# Patient Record
Sex: FEMALE | Race: WHITE | NOT HISPANIC OR LATINO | Employment: FULL TIME | URBAN - METROPOLITAN AREA
[De-identification: names, ages, dates, MRNs, and addresses within clinical notes are randomized per-mention and may not be internally consistent; named-entity substitution may affect disease eponyms.]

---

## 2022-09-28 ENCOUNTER — LAB REQUISITION (OUTPATIENT)
Dept: LAB | Facility: CLINIC | Age: 62
End: 2022-09-28

## 2022-09-28 LAB — SARS-COV-2 RNA RESP QL NAA+PROBE: NEGATIVE

## 2022-09-28 PROCEDURE — U0003 INFECTIOUS AGENT DETECTION BY NUCLEIC ACID (DNA OR RNA); SEVERE ACUTE RESPIRATORY SYNDROME CORONAVIRUS 2 (SARS-COV-2) (CORONAVIRUS DISEASE [COVID-19]), AMPLIFIED PROBE TECHNIQUE, MAKING USE OF HIGH THROUGHPUT TECHNOLOGIES AS DESCRIBED BY CMS-2020-01-R: HCPCS | Performed by: INTERNAL MEDICINE

## 2022-10-06 ENCOUNTER — LAB REQUISITION (OUTPATIENT)
Dept: LAB | Facility: CLINIC | Age: 62
End: 2022-10-06

## 2022-10-06 LAB — SARS-COV-2 RNA RESP QL NAA+PROBE: NEGATIVE

## 2022-10-06 PROCEDURE — U0003 INFECTIOUS AGENT DETECTION BY NUCLEIC ACID (DNA OR RNA); SEVERE ACUTE RESPIRATORY SYNDROME CORONAVIRUS 2 (SARS-COV-2) (CORONAVIRUS DISEASE [COVID-19]), AMPLIFIED PROBE TECHNIQUE, MAKING USE OF HIGH THROUGHPUT TECHNOLOGIES AS DESCRIBED BY CMS-2020-01-R: HCPCS | Performed by: INTERNAL MEDICINE

## 2022-10-12 ENCOUNTER — LAB REQUISITION (OUTPATIENT)
Dept: LAB | Facility: CLINIC | Age: 62
End: 2022-10-12

## 2022-10-12 PROCEDURE — U0003 INFECTIOUS AGENT DETECTION BY NUCLEIC ACID (DNA OR RNA); SEVERE ACUTE RESPIRATORY SYNDROME CORONAVIRUS 2 (SARS-COV-2) (CORONAVIRUS DISEASE [COVID-19]), AMPLIFIED PROBE TECHNIQUE, MAKING USE OF HIGH THROUGHPUT TECHNOLOGIES AS DESCRIBED BY CMS-2020-01-R: HCPCS | Performed by: INTERNAL MEDICINE

## 2022-10-13 ENCOUNTER — LAB REQUISITION (OUTPATIENT)
Dept: LAB | Facility: CLINIC | Age: 62
End: 2022-10-13

## 2022-10-13 DIAGNOSIS — R19.09 OTHER INTRA-ABDOMINAL AND PELVIC SWELLING, MASS AND LUMP: ICD-10-CM

## 2022-10-13 LAB
CANCER AG125 SERPL-ACNC: 12 U/ML
SARS-COV-2 RNA RESP QL NAA+PROBE: NEGATIVE

## 2022-10-13 PROCEDURE — 86304 IMMUNOASSAY TUMOR CA 125: CPT | Performed by: OBSTETRICS & GYNECOLOGY

## 2022-10-31 ENCOUNTER — TRANSFERRED RECORDS (OUTPATIENT)
Dept: HEALTH INFORMATION MANAGEMENT | Facility: CLINIC | Age: 62
End: 2022-10-31

## 2022-10-31 LAB
ALT SERPL-CCNC: 22 IU/L (ref 12–68)
AST SERPL-CCNC: 15 IU/L (ref 15–37)
CHOLESTEROL (EXTERNAL): 271 MG/DL
CREATININE (EXTERNAL): 1.18 MG/DL (ref 0.6–1)
GFR ESTIMATED (EXTERNAL): 52 ML/MIN/1.73M2
GLUCOSE (EXTERNAL): 141 MG/DL (ref 70–110)
HDLC SERPL-MCNC: 93 MG/DL
LDL CHOLESTEROL CALCULATED (EXTERNAL): 150 MG/DL
POTASSIUM (EXTERNAL): 3.5 MMOL/L (ref 3.5–5.1)
TRIGLYCERIDES (EXTERNAL): 138 MG/DL

## 2022-11-01 RX ORDER — AMLODIPINE BESYLATE 5 MG/1
5 TABLET ORAL EVERY MORNING
COMMUNITY

## 2022-11-01 RX ORDER — ACETAMINOPHEN 500 MG
1000 TABLET ORAL EVERY MORNING
COMMUNITY

## 2022-11-01 RX ORDER — IBUPROFEN 200 MG
800 TABLET ORAL PRN
COMMUNITY

## 2022-11-01 NOTE — PROGRESS NOTES
PTA medications updated by Medication Scribe prior to surgery via phone call with patient (last doses completed by Nurse)     Medication history sources: Patient, H&P and Patient's home med list  In the past week, patient estimated taking medication this percent of the time: Greater than 90%  Adherence assessment: N/A Not Observed    Significant changes made to the medication list:  None      Additional medication history information:   None    Medication reconciliation completed by provider prior to medication history? No    Time spent in this activity: 25 minutes    The information provided in this note is only as accurate as the sources available at the time of update(s)      Prior to Admission medications    Medication Sig Last Dose Taking? Auth Provider Long Term End Date   acetaminophen (TYLENOL) 500 MG tablet Take 1,000 mg by mouth every morning 11/1/2022 at AM Yes Reported, Patient     amLODIPine (NORVASC) 5 MG tablet Take 5 mg by mouth every morning  at AM Yes Reported, Patient Yes    ibuprofen (ADVIL/MOTRIN) 200 MG tablet Take 800 mg by mouth as needed for pain (4 x 200 mg = 800 mg) 1 month ago at Unknown Yes Reported, Patient

## 2022-11-02 ENCOUNTER — ANESTHESIA (OUTPATIENT)
Dept: SURGERY | Facility: CLINIC | Age: 62
End: 2022-11-02
Payer: COMMERCIAL

## 2022-11-02 ENCOUNTER — HOSPITAL ENCOUNTER (OUTPATIENT)
Facility: CLINIC | Age: 62
Discharge: HOME | End: 2022-11-03
Attending: OBSTETRICS & GYNECOLOGY | Admitting: UROLOGY
Payer: COMMERCIAL

## 2022-11-02 ENCOUNTER — ANESTHESIA EVENT (OUTPATIENT)
Dept: SURGERY | Facility: CLINIC | Age: 62
End: 2022-11-02
Payer: COMMERCIAL

## 2022-11-02 DIAGNOSIS — N81.4 UTERINE PROLAPSE: Primary | ICD-10-CM

## 2022-11-02 LAB
ABO/RH(D): NORMAL
ANTIBODY SCREEN: NEGATIVE
POTASSIUM BLD-SCNC: 3.4 MMOL/L (ref 3.4–5.3)
SPECIMEN EXPIRATION DATE: NORMAL

## 2022-11-02 PROCEDURE — 86850 RBC ANTIBODY SCREEN: CPT

## 2022-11-02 PROCEDURE — 370N000017 HC ANESTHESIA TECHNICAL FEE, PER MIN: Performed by: OBSTETRICS & GYNECOLOGY

## 2022-11-02 PROCEDURE — 86901 BLOOD TYPING SEROLOGIC RH(D): CPT

## 2022-11-02 PROCEDURE — 258N000003 HC RX IP 258 OP 636: Performed by: NURSE ANESTHETIST, CERTIFIED REGISTERED

## 2022-11-02 PROCEDURE — 258N000003 HC RX IP 258 OP 636: Performed by: UROLOGY

## 2022-11-02 PROCEDURE — 84132 ASSAY OF SERUM POTASSIUM: CPT | Performed by: ANESTHESIOLOGY

## 2022-11-02 PROCEDURE — 250N000025 HC SEVOFLURANE, PER MIN: Performed by: OBSTETRICS & GYNECOLOGY

## 2022-11-02 PROCEDURE — 250N000009 HC RX 250: Performed by: NURSE ANESTHETIST, CERTIFIED REGISTERED

## 2022-11-02 PROCEDURE — 258N000003 HC RX IP 258 OP 636: Performed by: OBSTETRICS & GYNECOLOGY

## 2022-11-02 PROCEDURE — 250N000011 HC RX IP 250 OP 636: Performed by: ANESTHESIOLOGY

## 2022-11-02 PROCEDURE — C1771 REP DEV, URINARY, W/SLING: HCPCS | Performed by: OBSTETRICS & GYNECOLOGY

## 2022-11-02 PROCEDURE — 360N000080 HC SURGERY LEVEL 7, PER MIN: Performed by: OBSTETRICS & GYNECOLOGY

## 2022-11-02 PROCEDURE — 999N000141 HC STATISTIC PRE-PROCEDURE NURSING ASSESSMENT: Performed by: OBSTETRICS & GYNECOLOGY

## 2022-11-02 PROCEDURE — 710N000009 HC RECOVERY PHASE 1, LEVEL 1, PER MIN: Performed by: OBSTETRICS & GYNECOLOGY

## 2022-11-02 PROCEDURE — 272N000001 HC OR GENERAL SUPPLY STERILE: Performed by: OBSTETRICS & GYNECOLOGY

## 2022-11-02 PROCEDURE — 36415 COLL VENOUS BLD VENIPUNCTURE: CPT | Performed by: ANESTHESIOLOGY

## 2022-11-02 PROCEDURE — 250N000009 HC RX 250: Performed by: UROLOGY

## 2022-11-02 PROCEDURE — 88305 TISSUE EXAM BY PATHOLOGIST: CPT | Mod: 26 | Performed by: PATHOLOGY

## 2022-11-02 PROCEDURE — 258N000001 HC RX 258: Performed by: OBSTETRICS & GYNECOLOGY

## 2022-11-02 PROCEDURE — 250N000011 HC RX IP 250 OP 636: Performed by: UROLOGY

## 2022-11-02 PROCEDURE — 88307 TISSUE EXAM BY PATHOLOGIST: CPT | Mod: 26 | Performed by: PATHOLOGY

## 2022-11-02 PROCEDURE — 250N000013 HC RX MED GY IP 250 OP 250 PS 637

## 2022-11-02 PROCEDURE — 250N000011 HC RX IP 250 OP 636

## 2022-11-02 PROCEDURE — C1781 MESH (IMPLANTABLE): HCPCS | Performed by: OBSTETRICS & GYNECOLOGY

## 2022-11-02 PROCEDURE — 250N000011 HC RX IP 250 OP 636: Performed by: OBSTETRICS & GYNECOLOGY

## 2022-11-02 PROCEDURE — 88305 TISSUE EXAM BY PATHOLOGIST: CPT | Mod: TC | Performed by: OBSTETRICS & GYNECOLOGY

## 2022-11-02 PROCEDURE — 36415 COLL VENOUS BLD VENIPUNCTURE: CPT

## 2022-11-02 PROCEDURE — 250N000011 HC RX IP 250 OP 636: Performed by: NURSE ANESTHETIST, CERTIFIED REGISTERED

## 2022-11-02 DEVICE — MESH SLING Y SHAPE RESTORELLE 24X4CM 501420: Type: IMPLANTABLE DEVICE | Site: PELVIS | Status: FUNCTIONAL

## 2022-11-02 DEVICE — MESH SLING OBTRYX-HALO M0068505000: Type: IMPLANTABLE DEVICE | Site: PELVIS | Status: FUNCTIONAL

## 2022-11-02 RX ORDER — ACETAMINOPHEN 500 MG
1000 TABLET ORAL EVERY MORNING
Status: DISCONTINUED | OUTPATIENT
Start: 2022-11-03 | End: 2022-11-03 | Stop reason: HOSPADM

## 2022-11-02 RX ORDER — ONDANSETRON 2 MG/ML
4 INJECTION INTRAMUSCULAR; INTRAVENOUS EVERY 30 MIN PRN
Status: DISCONTINUED | OUTPATIENT
Start: 2022-11-02 | End: 2022-11-02 | Stop reason: HOSPADM

## 2022-11-02 RX ORDER — FENTANYL CITRATE 0.05 MG/ML
50 INJECTION, SOLUTION INTRAMUSCULAR; INTRAVENOUS EVERY 5 MIN PRN
Status: DISCONTINUED | OUTPATIENT
Start: 2022-11-02 | End: 2022-11-02 | Stop reason: HOSPADM

## 2022-11-02 RX ORDER — OXYCODONE HYDROCHLORIDE 5 MG/1
5 TABLET ORAL EVERY 4 HOURS PRN
Status: DISCONTINUED | OUTPATIENT
Start: 2022-11-02 | End: 2022-11-02 | Stop reason: HOSPADM

## 2022-11-02 RX ORDER — HYDROMORPHONE HCL IN WATER/PF 6 MG/30 ML
0.4 PATIENT CONTROLLED ANALGESIA SYRINGE INTRAVENOUS
Status: DISCONTINUED | OUTPATIENT
Start: 2022-11-02 | End: 2022-11-03 | Stop reason: HOSPADM

## 2022-11-02 RX ORDER — FENTANYL CITRATE 50 UG/ML
INJECTION, SOLUTION INTRAMUSCULAR; INTRAVENOUS PRN
Status: DISCONTINUED | OUTPATIENT
Start: 2022-11-02 | End: 2022-11-02

## 2022-11-02 RX ORDER — ONDANSETRON 2 MG/ML
4 INJECTION INTRAMUSCULAR; INTRAVENOUS EVERY 6 HOURS PRN
Status: DISCONTINUED | OUTPATIENT
Start: 2022-11-02 | End: 2022-11-03 | Stop reason: HOSPADM

## 2022-11-02 RX ORDER — HALOPERIDOL 5 MG/ML
1 INJECTION INTRAMUSCULAR EVERY 6 HOURS PRN
Status: DISCONTINUED | OUTPATIENT
Start: 2022-11-02 | End: 2022-11-02 | Stop reason: HOSPADM

## 2022-11-02 RX ORDER — CEFOXITIN 2 G/1
2 INJECTION, POWDER, FOR SOLUTION INTRAVENOUS EVERY 8 HOURS
Status: DISCONTINUED | OUTPATIENT
Start: 2022-11-02 | End: 2022-11-02 | Stop reason: HOSPADM

## 2022-11-02 RX ORDER — PROPOFOL 10 MG/ML
INJECTION, EMULSION INTRAVENOUS CONTINUOUS PRN
Status: DISCONTINUED | OUTPATIENT
Start: 2022-11-02 | End: 2022-11-02

## 2022-11-02 RX ORDER — HYDROMORPHONE HYDROCHLORIDE 1 MG/ML
INJECTION, SOLUTION INTRAMUSCULAR; INTRAVENOUS; SUBCUTANEOUS PRN
Status: DISCONTINUED | OUTPATIENT
Start: 2022-11-02 | End: 2022-11-02

## 2022-11-02 RX ORDER — HYDROMORPHONE HCL IN WATER/PF 6 MG/30 ML
0.2 PATIENT CONTROLLED ANALGESIA SYRINGE INTRAVENOUS EVERY 5 MIN PRN
Status: DISCONTINUED | OUTPATIENT
Start: 2022-11-02 | End: 2022-11-02 | Stop reason: HOSPADM

## 2022-11-02 RX ORDER — ONDANSETRON 2 MG/ML
INJECTION INTRAMUSCULAR; INTRAVENOUS PRN
Status: DISCONTINUED | OUTPATIENT
Start: 2022-11-02 | End: 2022-11-02

## 2022-11-02 RX ORDER — NALOXONE HYDROCHLORIDE 0.4 MG/ML
0.4 INJECTION, SOLUTION INTRAMUSCULAR; INTRAVENOUS; SUBCUTANEOUS
Status: DISCONTINUED | OUTPATIENT
Start: 2022-11-02 | End: 2022-11-03 | Stop reason: HOSPADM

## 2022-11-02 RX ORDER — ESTRADIOL 0.1 MG/G
CREAM VAGINAL
Qty: 42 G | Refills: 0 | Status: SHIPPED | OUTPATIENT
Start: 2022-11-02

## 2022-11-02 RX ORDER — DOCUSATE SODIUM 100 MG/1
100 CAPSULE, LIQUID FILLED ORAL 2 TIMES DAILY
Qty: 30 CAPSULE | Refills: 0 | Status: SHIPPED | OUTPATIENT
Start: 2022-11-02 | End: 2022-11-17

## 2022-11-02 RX ORDER — KETOROLAC TROMETHAMINE 15 MG/ML
15 INJECTION, SOLUTION INTRAMUSCULAR; INTRAVENOUS EVERY 6 HOURS
Status: DISPENSED | OUTPATIENT
Start: 2022-11-02 | End: 2022-11-03

## 2022-11-02 RX ORDER — CEPHALEXIN 500 MG/1
500 CAPSULE ORAL 2 TIMES DAILY
Qty: 4 CAPSULE | Refills: 0 | Status: SHIPPED | OUTPATIENT
Start: 2022-11-02 | End: 2022-11-04

## 2022-11-02 RX ORDER — ONDANSETRON 4 MG/1
4-8 TABLET, ORALLY DISINTEGRATING ORAL EVERY 8 HOURS PRN
Qty: 10 TABLET | Refills: 0 | Status: SHIPPED | OUTPATIENT
Start: 2022-11-02

## 2022-11-02 RX ORDER — BUPIVACAINE HYDROCHLORIDE AND EPINEPHRINE 2.5; 5 MG/ML; UG/ML
INJECTION, SOLUTION EPIDURAL; INFILTRATION; INTRACAUDAL; PERINEURAL PRN
Status: DISCONTINUED | OUTPATIENT
Start: 2022-11-02 | End: 2022-11-02 | Stop reason: HOSPADM

## 2022-11-02 RX ORDER — NALOXONE HYDROCHLORIDE 0.4 MG/ML
0.2 INJECTION, SOLUTION INTRAMUSCULAR; INTRAVENOUS; SUBCUTANEOUS
Status: DISCONTINUED | OUTPATIENT
Start: 2022-11-02 | End: 2022-11-03 | Stop reason: HOSPADM

## 2022-11-02 RX ORDER — CEFOXITIN 1 G/1
1 INJECTION, POWDER, FOR SOLUTION INTRAVENOUS EVERY 8 HOURS
Status: DISCONTINUED | OUTPATIENT
Start: 2022-11-02 | End: 2022-11-03 | Stop reason: HOSPADM

## 2022-11-02 RX ORDER — HYDROMORPHONE HCL IN WATER/PF 6 MG/30 ML
0.2 PATIENT CONTROLLED ANALGESIA SYRINGE INTRAVENOUS
Status: DISCONTINUED | OUTPATIENT
Start: 2022-11-02 | End: 2022-11-03 | Stop reason: HOSPADM

## 2022-11-02 RX ORDER — LIDOCAINE 40 MG/G
CREAM TOPICAL
Status: DISCONTINUED | OUTPATIENT
Start: 2022-11-02 | End: 2022-11-03 | Stop reason: HOSPADM

## 2022-11-02 RX ORDER — ONDANSETRON 4 MG/1
4 TABLET, ORALLY DISINTEGRATING ORAL EVERY 6 HOURS PRN
Status: DISCONTINUED | OUTPATIENT
Start: 2022-11-02 | End: 2022-11-03 | Stop reason: HOSPADM

## 2022-11-02 RX ORDER — SODIUM CHLORIDE, SODIUM LACTATE, POTASSIUM CHLORIDE, CALCIUM CHLORIDE 600; 310; 30; 20 MG/100ML; MG/100ML; MG/100ML; MG/100ML
INJECTION, SOLUTION INTRAVENOUS CONTINUOUS PRN
Status: DISCONTINUED | OUTPATIENT
Start: 2022-11-02 | End: 2022-11-02

## 2022-11-02 RX ORDER — DEXAMETHASONE SODIUM PHOSPHATE 4 MG/ML
INJECTION, SOLUTION INTRA-ARTICULAR; INTRALESIONAL; INTRAMUSCULAR; INTRAVENOUS; SOFT TISSUE PRN
Status: DISCONTINUED | OUTPATIENT
Start: 2022-11-02 | End: 2022-11-02

## 2022-11-02 RX ORDER — ONDANSETRON 4 MG/1
4 TABLET, ORALLY DISINTEGRATING ORAL EVERY 30 MIN PRN
Status: DISCONTINUED | OUTPATIENT
Start: 2022-11-02 | End: 2022-11-02 | Stop reason: HOSPADM

## 2022-11-02 RX ORDER — AMLODIPINE BESYLATE 5 MG/1
5 TABLET ORAL EVERY MORNING
Status: DISCONTINUED | OUTPATIENT
Start: 2022-11-03 | End: 2022-11-03 | Stop reason: HOSPADM

## 2022-11-02 RX ORDER — OXYCODONE HYDROCHLORIDE 5 MG/1
10 TABLET ORAL EVERY 4 HOURS PRN
Status: DISCONTINUED | OUTPATIENT
Start: 2022-11-02 | End: 2022-11-03 | Stop reason: HOSPADM

## 2022-11-02 RX ORDER — OXYCODONE HYDROCHLORIDE 5 MG/1
5 TABLET ORAL EVERY 6 HOURS PRN
Qty: 12 TABLET | Refills: 0 | Status: SHIPPED | OUTPATIENT
Start: 2022-11-02

## 2022-11-02 RX ORDER — OXYCODONE HYDROCHLORIDE 5 MG/1
5 TABLET ORAL EVERY 4 HOURS PRN
Status: DISCONTINUED | OUTPATIENT
Start: 2022-11-02 | End: 2022-11-03 | Stop reason: HOSPADM

## 2022-11-02 RX ORDER — SODIUM CHLORIDE 9 MG/ML
INJECTION, SOLUTION INTRAVENOUS CONTINUOUS
Status: DISCONTINUED | OUTPATIENT
Start: 2022-11-02 | End: 2022-11-03 | Stop reason: HOSPADM

## 2022-11-02 RX ORDER — ACETAMINOPHEN 325 MG/1
650 TABLET ORAL EVERY 4 HOURS PRN
Qty: 100 TABLET | Refills: 0 | Status: SHIPPED | OUTPATIENT
Start: 2022-11-02

## 2022-11-02 RX ORDER — FENTANYL CITRATE 0.05 MG/ML
25 INJECTION, SOLUTION INTRAMUSCULAR; INTRAVENOUS
Status: DISCONTINUED | OUTPATIENT
Start: 2022-11-02 | End: 2022-11-02 | Stop reason: HOSPADM

## 2022-11-02 RX ORDER — PHENAZOPYRIDINE HYDROCHLORIDE 200 MG/1
200 TABLET, FILM COATED ORAL ONCE
Status: COMPLETED | OUTPATIENT
Start: 2022-11-02 | End: 2022-11-02

## 2022-11-02 RX ORDER — NEOSTIGMINE METHYLSULFATE 1 MG/ML
VIAL (ML) INJECTION PRN
Status: DISCONTINUED | OUTPATIENT
Start: 2022-11-02 | End: 2022-11-02

## 2022-11-02 RX ORDER — LIDOCAINE HYDROCHLORIDE 20 MG/ML
INJECTION, SOLUTION INFILTRATION; PERINEURAL PRN
Status: DISCONTINUED | OUTPATIENT
Start: 2022-11-02 | End: 2022-11-02

## 2022-11-02 RX ORDER — PROPOFOL 10 MG/ML
INJECTION, EMULSION INTRAVENOUS PRN
Status: DISCONTINUED | OUTPATIENT
Start: 2022-11-02 | End: 2022-11-02

## 2022-11-02 RX ORDER — SODIUM CHLORIDE, SODIUM LACTATE, POTASSIUM CHLORIDE, CALCIUM CHLORIDE 600; 310; 30; 20 MG/100ML; MG/100ML; MG/100ML; MG/100ML
INJECTION, SOLUTION INTRAVENOUS CONTINUOUS
Status: DISCONTINUED | OUTPATIENT
Start: 2022-11-02 | End: 2022-11-02 | Stop reason: HOSPADM

## 2022-11-02 RX ORDER — GLYCOPYRROLATE 0.2 MG/ML
INJECTION, SOLUTION INTRAMUSCULAR; INTRAVENOUS PRN
Status: DISCONTINUED | OUTPATIENT
Start: 2022-11-02 | End: 2022-11-02

## 2022-11-02 RX ADMIN — KETOROLAC TROMETHAMINE 15 MG: 15 INJECTION, SOLUTION INTRAMUSCULAR; INTRAVENOUS at 16:42

## 2022-11-02 RX ADMIN — DEXAMETHASONE SODIUM PHOSPHATE 4 MG: 4 INJECTION, SOLUTION INTRA-ARTICULAR; INTRALESIONAL; INTRAMUSCULAR; INTRAVENOUS; SOFT TISSUE at 12:53

## 2022-11-02 RX ADMIN — MIDAZOLAM 2 MG: 1 INJECTION INTRAMUSCULAR; INTRAVENOUS at 12:26

## 2022-11-02 RX ADMIN — PHENYLEPHRINE HYDROCHLORIDE 100 MCG: 10 INJECTION INTRAVENOUS at 15:28

## 2022-11-02 RX ADMIN — CEFOXITIN SODIUM 2 G: 2 POWDER, FOR SOLUTION INTRAVENOUS at 14:28

## 2022-11-02 RX ADMIN — ONDANSETRON 4 MG: 2 INJECTION INTRAMUSCULAR; INTRAVENOUS at 15:41

## 2022-11-02 RX ADMIN — GLYCOPYRROLATE 0.1 MG: 0.2 INJECTION, SOLUTION INTRAMUSCULAR; INTRAVENOUS at 15:19

## 2022-11-02 RX ADMIN — GLYCOPYRROLATE 0.2 MG: 0.2 INJECTION, SOLUTION INTRAMUSCULAR; INTRAVENOUS at 13:06

## 2022-11-02 RX ADMIN — PROPOFOL 200 MG: 10 INJECTION, EMULSION INTRAVENOUS at 12:33

## 2022-11-02 RX ADMIN — PROPOFOL 30 MCG/KG/MIN: 10 INJECTION, EMULSION INTRAVENOUS at 12:33

## 2022-11-02 RX ADMIN — PHENYLEPHRINE HYDROCHLORIDE 100 MCG: 10 INJECTION INTRAVENOUS at 13:23

## 2022-11-02 RX ADMIN — HYDROMORPHONE HYDROCHLORIDE 0.5 MG: 1 INJECTION, SOLUTION INTRAMUSCULAR; INTRAVENOUS; SUBCUTANEOUS at 13:34

## 2022-11-02 RX ADMIN — SODIUM CHLORIDE: 9 INJECTION, SOLUTION INTRAVENOUS at 20:32

## 2022-11-02 RX ADMIN — ROCURONIUM BROMIDE 20 MG: 50 INJECTION, SOLUTION INTRAVENOUS at 13:50

## 2022-11-02 RX ADMIN — LIDOCAINE HYDROCHLORIDE 100 MG: 20 INJECTION, SOLUTION INFILTRATION; PERINEURAL at 12:33

## 2022-11-02 RX ADMIN — PHENYLEPHRINE HYDROCHLORIDE 100 MCG: 10 INJECTION INTRAVENOUS at 13:28

## 2022-11-02 RX ADMIN — NEOSTIGMINE METHYLSULFATE 4 MG: 1 INJECTION, SOLUTION INTRAVENOUS at 15:34

## 2022-11-02 RX ADMIN — KETOROLAC TROMETHAMINE 15 MG: 15 INJECTION, SOLUTION INTRAMUSCULAR; INTRAVENOUS at 22:46

## 2022-11-02 RX ADMIN — FENTANYL CITRATE 50 MCG: 50 INJECTION, SOLUTION INTRAMUSCULAR; INTRAVENOUS at 17:11

## 2022-11-02 RX ADMIN — ROCURONIUM BROMIDE 20 MG: 50 INJECTION, SOLUTION INTRAVENOUS at 15:01

## 2022-11-02 RX ADMIN — HALOPERIDOL LACTATE 1 MG: 5 INJECTION, SOLUTION INTRAMUSCULAR at 17:08

## 2022-11-02 RX ADMIN — PHENYLEPHRINE HYDROCHLORIDE 100 MCG: 10 INJECTION INTRAVENOUS at 12:59

## 2022-11-02 RX ADMIN — ROCURONIUM BROMIDE 50 MG: 50 INJECTION, SOLUTION INTRAVENOUS at 12:33

## 2022-11-02 RX ADMIN — ROCURONIUM BROMIDE 10 MG: 50 INJECTION, SOLUTION INTRAVENOUS at 14:18

## 2022-11-02 RX ADMIN — SODIUM CHLORIDE, POTASSIUM CHLORIDE, SODIUM LACTATE AND CALCIUM CHLORIDE: 600; 310; 30; 20 INJECTION, SOLUTION INTRAVENOUS at 12:26

## 2022-11-02 RX ADMIN — CEFOXITIN SODIUM 2 G: 2 POWDER, FOR SOLUTION INTRAVENOUS at 12:32

## 2022-11-02 RX ADMIN — ROCURONIUM BROMIDE 10 MG: 50 INJECTION, SOLUTION INTRAVENOUS at 13:14

## 2022-11-02 RX ADMIN — PHENYLEPHRINE HYDROCHLORIDE 100 MCG: 10 INJECTION INTRAVENOUS at 12:44

## 2022-11-02 RX ADMIN — PHENAZOPYRIDINE HYDROCHLORIDE 200 MG: 200 TABLET ORAL at 11:10

## 2022-11-02 RX ADMIN — CEFOXITIN SODIUM 1 G: 1 POWDER, FOR SOLUTION INTRAVENOUS at 22:51

## 2022-11-02 RX ADMIN — ROCURONIUM BROMIDE 10 MG: 50 INJECTION, SOLUTION INTRAVENOUS at 14:46

## 2022-11-02 RX ADMIN — GLYCOPYRROLATE 0.6 MG: 0.2 INJECTION, SOLUTION INTRAMUSCULAR; INTRAVENOUS at 15:34

## 2022-11-02 RX ADMIN — FENTANYL CITRATE 100 MCG: 50 INJECTION, SOLUTION INTRAMUSCULAR; INTRAVENOUS at 12:33

## 2022-11-02 RX ADMIN — ONDANSETRON 4 MG: 2 INJECTION INTRAMUSCULAR; INTRAVENOUS at 16:28

## 2022-11-02 ASSESSMENT — ENCOUNTER SYMPTOMS
DYSRHYTHMIAS: 0
SEIZURES: 0

## 2022-11-02 ASSESSMENT — ACTIVITIES OF DAILY LIVING (ADL)
ADLS_ACUITY_SCORE: 20
ADLS_ACUITY_SCORE: 26
ADLS_ACUITY_SCORE: 20

## 2022-11-02 ASSESSMENT — LIFESTYLE VARIABLES: TOBACCO_USE: 1

## 2022-11-02 NOTE — OP NOTE
Procedure Date: 11/02/2022    PREOPERATIVE DIAGNOSES:  Cystocele, enterocele, grade 3 uterine prolapse, grade 2, and stress incontinence.    POSTOPERATIVE DIAGNOSES:  Cystocele, enterocele, grade 3 uterine prolapse, grade 2, and stress incontinence.    PROCEDURES PERFORMED:  Robotically-assisted sacral colpopexy, midurethral sling placement and cystoscopy.    FIRST ASSISTANT:  Vincenzo Bond PA-C, in conjunction with robotically-assisted supracervical hysterectomy, bilateral salpingo-oophorectomy and rectocele repair by Dr. Farias, first assist Emma Sun MD    ESTIMATED BLOOD LOSS:  30 mL    SPECIMENS:    1.  Uterus with no cervix, bilateral fallopian tubes and ovaries.  2.  Vaginal mucosa.    ESTIMATED BLOOD LOSS:  20 mL.    INDICATIONS FOR PROCEDURE:  Estefania is a 61-year-old female with a symptomatic pelvic organ prolapse grade 3 quite large vaginal length and stress incontinence who presents for above procedure.  She understands the risks of the procedure including bleeding, infection, injury, de zechariah or urge incontinence, urinary retention, mesh erosion, dyspareunia, small bowel obstruction.  She received FDA warning regarding all vaginally placed meshes.  She would like to proceed.    DESCRIPTION OF PROCEDURE:  Estefania was brought to the operating room and placed in supine position.  After excellent induction of general anesthesia and placement of OG tube her perineal abdominal areas were prepped and draped in the regular fashion.  Midline incision was made above the umbilicus and peritoneum was insufflated to the pressure of 15 using a Veress needle, 8 mm robotic trocar was placed.  Evaluation of abdominal cavity did not demonstrate any abdominal adhesions.  Additional 8 mm robotic trocars were placed throughout the abdomen and a 5 mm airport seal in the right abdomen.  Xi robot was docked in after the patient was turned to steep Trendelenburg position.    Case was turned over to Dr. Farias, who proceeded  with the supracervical hysterectomy, bilateral salpingo-oophorectomy, which I assisted.  Once the specimen was positioned in EndoCatch bag, I proceeded with my part of the procedure.  Of note, there were no obvious nodules identified in the left pelvis, which would be consistent with the findings on the pelvic ultrasound.  Of note, she did have an approximately 1.5 cm posterior uterine fibroid next to the cervix, but no other pathology identified.    Once the specimen was positioned in EndoCatch bag, I proceeded with the sacral colpopexy.  Peritoneum on top of the sacrum was opened up, 2-0 Prolene sutures x2 were placed through the anterior longitudinal ligaments on top of the sacrum.  Peritoneum was opened up all the way to the cervix and cervical canal was fulgurated and imbricated using 3-0 PDS figure-of-eight x 3.  I dissected anteriorly and posteriorly on top of the vagina through vesicovaginal and rectovaginal fascia.  Anteriorly vaginal wall appeared to be very redundant and quite thin.  In the process, there was a small vaginostomy made approximately 2-3 mm in size, which I closed using figure-of-eight 2-0 Vicryl in addition and I imbricated and plicated the whole layer of anterior vaginal mucosa in the area of vaginostomy.  That made the anterior vaginal length little bit shorter.  Once dissection was complete, I positioned polypropylene type 1 mesh from [employer] called Restorelle on top of the apex of the vagina and sutured using interrupted 2-0 Ethibond over anterior and posterior surfaces with addition of 3-0 PDS over more distal portion anteriorly and posteriorly.  Of note, I made sure the graft was not covering the area of vaginostomy which appeared to be more distal.    I adjusted tension-free positioning of the graft and sutured the long portion of the Y mesh to already preplaced sutures over the sacrum.  All extra amount of the graft material was trimmed, removed.  The anterior peritoneum was  closed on top of the graft, completely retroperitonealizing the graft using 2-0 Vicryl in a running fashion.  While Dr. Farias proceeded with extraction of the specimen, I did midurethral sling placement.    Periurethral space was hydrodistended using Marcaine with epinephrine, 20 mL of local was used for that.  A 1.5 cm incision was made at the mid urethra and space was dissected all the way to the pubic ramus bilaterally.  I positioned polypropylene type 1 mesh from [employer] kit called Obtryx going through the obturator foramens using outside-in technique.  Once the graft was positioned, I performed cystoscopy that demonstrated no stitch, no mesh in the bladder or urethra.  I adjusted tension-free positioning of the graft by placing #8 Hegar dilator between the  in the urethra and the graft itself.  Anterior vaginal wall was closed using 2-0 Vicryl in a running fashion.  Exofin was applied to the incisions over thigh area.  When I did cystoscopy, I also confirmed there was efflux of urine coming from both ureteral orifices  catheter was removed.  The case was turned over to Dr. Farias, who proceeded with the rectocele repair, which I assisted.  Once that was done, vaginal packing was placed and the patient was transferred to recovery in stable condition.    Emma Sun MD        D: 2022   T: 2022   MT: KATIANA    Name:     RICK HANSON  MRN:      [id number]        Account:        [id number]   :      1960           Procedure Date: 2022     Document: [id number]    cc:  Emma Sun MD

## 2022-11-02 NOTE — ANESTHESIA PROCEDURE NOTES
Airway       Patient location during procedure: OR       Procedure Start/Stop Times: 11/2/2022 12:35 PM  Staff -        CRNA: Julianne Hampton APRN CRNA       Performed By: CRNA  Consent for Airway        Urgency: elective  Indications and Patient Condition       Indications for airway management: andry-procedural       Induction type:intravenous       Mask difficulty assessment: 2 - vent by mask + OA or adjuvant +/- NMBA    Final Airway Details       Final airway type: endotracheal airway       Successful airway: ETT - single  Endotracheal Airway Details        ETT size (mm): 7.0       Cuffed: yes       Successful intubation technique: direct laryngoscopy       Grade View of Cords: 1       Adjucts: stylet       Position: Right       Measured from: gums/teeth       Secured at (cm): 22       Bite block used: None    Post intubation assessment        Placement verified by: capnometry, equal breath sounds and chest rise        Number of attempts at approach: 1       Secured with: silk tape       Ease of procedure: easy       Dentition: Intact and Unchanged    Medication(s) Administered   Medication Administration Time: 11/2/2022 12:35 PM

## 2022-11-02 NOTE — BRIEF OP NOTE
[facility] Brief Operative Note    Pre-operative diagnosis: Female genital prolapse [N81.9]   Post-operative diagnosis Cystocele grade 3, enterocele grade 3; stress incontinence    Procedure: Procedure(s):  ROBOTIC ASSISTED LAPAROSCOPIC SUPRACERVICAL HYSTERECTOMY  BILATERAL SALPINGO-OOPHORECTOMY  ROBOTIC ASSISTED SACROCOLPOPEXY, CYSTOSCOPY,  MIDURETHRAL BLADDER  SLING  RECTOCELE REPAIR   Surgeon: Emma Sun MD   Assistants(s): SULEMA Landaverde   Estimated blood loss: Less than 50 ml    Specimens: Uterus and no  Cervix and efrain fallopian tubes and mucosa    Findings:

## 2022-11-02 NOTE — BRIEF OP NOTE
[facility]    Brief Operative Note    Pre-operative diagnosis: Female genital prolapse [N81.9]  Post-operative diagnosis same, with a posterior uterine fibroid    Procedure: Procedure(s):  ROBOTIC ASSISTED LAPAROSCOPIC SUPRACERVICAL HYSTERECTOMY  BILATERAL SALPINGO-OOPHORECTOMY  ROBOTIC ASSISTED SACROCOLPOPEXY, CYSTOSCOPY,  MIDURETHRAL BLADDER  SLING  RECTOCELE REPAIR  LYSIS OF ADHESIONS    Surgeon: Surgeon(s) and Role:  Panel 1:     * Dmitriy Farias MD - Primary  Panel 2:     * Emma Sun MD - Primary     * Dmitriy Farias MD - Assisting     * Vincenzo Bond PA-C - Assisting  Panel 3:     * Dmitriy Farias MD - Primary  Anesthesia: General   Estimated Blood Loss: 20 mL from 11/2/2022 12:26 PM to 11/2/2022  4:10 PM      Drains: None  Specimens:   ID Type Source Tests Collected by Time Destination   1 :  Tissue Uterus, Bilateral Fallopian Tubes & Ovaries SURGICAL PATHOLOGY EXAM Dmitriy Farias MD 11/2/2022  3:37 PM    2 :  Tissue Vaginal Mucosa SURGICAL PATHOLOGY EXAM Dmitriy Farias MD 11/2/2022  3:47 PM      Findings: BUS, EG normal.  Grade III cystocele, grade III uterine and apical prolapse, grade 1-1+ rectocele.  At laparoscopy, filmy adhesions of the sigmoid colon to the left lateral abdominal wall.  Uterus atrophic, normal contour.  2 5 cm fibroid in the left posterior uterine body.  Ovaries and tubes atrophic.  Varicosity of the right fallpian tube. After the sacrocolpopexy and sling procedure, there remained a grade I rectocele.  Rectocele repair completed in the usual fashion. Excellent anatomic results of the prolapse repair.     Complications: None.  Implants:   Implant Name Type Inv. Item Serial No.  Lot No. LRB No. Used Action   MESH SLING OBTRYX-HALO [id number] - [id number] Mesh MESH SLING OBTRYX-HALO [id number]  [employer] [id number] N/A 1 Implanted   MESH SLING Y SHAPE RESTORELLE [id number] - [id number] Mesh MESH SLING Y SHAPE  RESTORELLE [id number] [id number]  [employer] [id number] N/A 1 Implanted

## 2022-11-02 NOTE — ANESTHESIA PREPROCEDURE EVALUATION
Anesthesia Pre-Procedure Evaluation    Patient: Nirmala Godoy   MRN: [id number] : 1960        Procedure : Procedure(s):  ROBOTIC ASSISTED LAPAROSCOPIC SUPRACERVICAL HYSTERECTOMY  BILATERAL SALPINGO-OOPHORECTOMY  ROBOTIC ASSISTED SACROCOLPOPEXY, CYSTOSCOPY,  MIDURETHRAL BLADDER  SLING  POSSIBLE VAGINAL RECTOCELE, POSSIBLE CYSTOCELE REPAIR          Past Medical History:   Diagnosis Date     Hyperlipidemia LDL goal <100      Hypertension       Past Surgical History:   Procedure Laterality Date     BACK SURGERY       GYN SURGERY       TONSILLECTOMY        Allergies   Allergen Reactions     Azithromycin      Codeine      Morphine       Social History     Tobacco Use     Smoking status: Former     Types: Cigarettes     Smokeless tobacco: Never   Substance Use Topics     Alcohol use: Yes      Wt Readings from Last 1 Encounters:   No data found for Wt        Anesthesia Evaluation   Pt has had prior anesthetic. Type: General.    No history of anesthetic complications       ROS/MED HX  ENT/Pulmonary:     (+) tobacco use, Past use,  (-) sleep apnea and recent URI   Neurologic:    (-) no seizures, no CVA and no TIA   Cardiovascular:     (+) Dyslipidemia hypertension----- (-) arrhythmias   METS/Exercise Tolerance: >4 METS    Hematologic:       Musculoskeletal:       GI/Hepatic:    (-) GERD and liver disease   Renal/Genitourinary:    (-) renal disease   Endo:    (-) Type II DM   Psychiatric/Substance Use:       Infectious Disease:    (-) Recent Fever   Malignancy:       Other:            Physical Exam    Airway        Mallampati: II   TM distance: > 3 FB   Neck ROM: full   Mouth opening: > 3 cm    Respiratory Devices and Support         Dental       (+) caps    B=Bridge, C=Chipped, L=Loose, M=Missing    Cardiovascular          Rhythm and rate: regular and normal     Pulmonary           breath sounds clear to auscultation           OUTSIDE LABS:  CBC: No results found for: WBC, HGB, HCT, PLT  BMP: No results found for:  NA, POTASSIUM, CHLORIDE, CO2, BUN, CR, GLC  COAGS: No results found for: PTT, INR, FIBR  POC: No results found for: BGM, HCG, HCGS  HEPATIC: No results found for: ALBUMIN, PROTTOTAL, ALT, AST, GGT, ALKPHOS, BILITOTAL, BILIDIRECT, EMILY  OTHER: No results found for: PH, LACT, A1C, CHOLO, PHOS, MAG, LIPASE, AMYLASE, TSH, T4, T3, CRP, SED    Anesthesia Plan    ASA Status:  2   NPO Status:  NPO Appropriate    Anesthesia Type: General.     - Airway: ETT   Induction: Intravenous, Propofol.   Maintenance: Balanced.   Techniques and Equipment:     - Lines/Monitors: 2nd IV     Consents    Anesthesia Plan(s) and associated risks, benefits, and realistic alternatives discussed. Questions answered and patient/representative(s) expressed understanding.     - Discussed: Risks, Benefits and Alternatives for the PROCEDURE were discussed     - Discussed with:  Patient         Postoperative Care    Pain management: Multi-modal analgesia.   PONV prophylaxis: Background Propofol Infusion, Ondansetron (or other 5HT-3), Dexamethasone or Solumedrol     Comments:                Rebecca Chong MD

## 2022-11-02 NOTE — ANESTHESIA CARE TRANSFER NOTE
Patient: Nirmala Godoy    Procedure: Procedure(s):  ROBOTIC ASSISTED LAPAROSCOPIC SUPRACERVICAL HYSTERECTOMY  BILATERAL SALPINGO-OOPHORECTOMY  ROBOTIC ASSISTED SACROCOLPOPEXY, CYSTOSCOPY,  MIDURETHRAL BLADDER  SLING  RECTOCELE REPAIR       Diagnosis: Female genital prolapse [N81.9]  Diagnosis Additional Information: No value filed.    Anesthesia Type:   General     Note:    Oropharynx: oropharynx clear of all foreign objects  Level of Consciousness: awake  Oxygen Supplementation: face mask    Independent Airway: airway patency satisfactory and stable  Dentition: dentition unchanged  Vital Signs Stable: post-procedure vital signs reviewed and stable  Report to RN Given: handoff report given  Patient transferred to: PACU    Handoff Report: Identifed the Patient, Identified the Reponsible Provider, Reviewed the pertinent medical history, Discussed the surgical course, Reviewed Intra-OP anesthesia mangement and issues during anesthesia, Set expectations for post-procedure period and Allowed opportunity for questions and acknowledgement of understanding      Vitals:  Vitals Value Taken Time   /85 11/02/22 1614   Temp     Pulse 79 11/02/22 1619   Resp 10 11/02/22 1619   SpO2 96 % 11/02/22 1619   Vitals shown include unvalidated device data.    Electronically Signed By: RED Richardson CRNA  November 2, 2022  4:20 PM

## 2022-11-03 VITALS
TEMPERATURE: 98.7 F | RESPIRATION RATE: 16 BRPM | BODY MASS INDEX: 27.35 KG/M2 | SYSTOLIC BLOOD PRESSURE: 101 MMHG | WEIGHT: 160.2 LBS | HEART RATE: 71 BPM | OXYGEN SATURATION: 95 % | DIASTOLIC BLOOD PRESSURE: 49 MMHG | HEIGHT: 64 IN

## 2022-11-03 LAB — GLUCOSE BLDC GLUCOMTR-MCNC: 118 MG/DL (ref 70–99)

## 2022-11-03 PROCEDURE — 250N000013 HC RX MED GY IP 250 OP 250 PS 637: Performed by: UROLOGY

## 2022-11-03 PROCEDURE — 258N000003 HC RX IP 258 OP 636: Performed by: UROLOGY

## 2022-11-03 PROCEDURE — 82962 GLUCOSE BLOOD TEST: CPT

## 2022-11-03 PROCEDURE — 250N000011 HC RX IP 250 OP 636: Performed by: UROLOGY

## 2022-11-03 RX ADMIN — CEFOXITIN SODIUM 1 G: 1 POWDER, FOR SOLUTION INTRAVENOUS at 15:46

## 2022-11-03 RX ADMIN — AMLODIPINE BESYLATE 5 MG: 5 TABLET ORAL at 08:35

## 2022-11-03 RX ADMIN — CEFOXITIN SODIUM 1 G: 1 POWDER, FOR SOLUTION INTRAVENOUS at 05:31

## 2022-11-03 RX ADMIN — ACETAMINOPHEN 1000 MG: 500 TABLET ORAL at 08:35

## 2022-11-03 RX ADMIN — KETOROLAC TROMETHAMINE 15 MG: 15 INJECTION, SOLUTION INTRAMUSCULAR; INTRAVENOUS at 05:31

## 2022-11-03 RX ADMIN — SODIUM CHLORIDE: 9 INJECTION, SOLUTION INTRAVENOUS at 05:34

## 2022-11-03 ASSESSMENT — ACTIVITIES OF DAILY LIVING (ADL)
ADLS_ACUITY_SCORE: 26

## 2022-11-03 NOTE — DISCHARGE INSTRUCTIONS
Discharge Instructions following Gynecological   Laparoscopy, Hysteroscopy, or Pelviscopy  [facility]      Note:   Do not drive a car, drink alcohol, or use machinery for the next 24 hours or while taking narcotic pain medications.  You should wait until you have recovered before making any important decisions.  Diet:  Diet as tolerated.  A bland diet or light diet is advisable the day of surgery or if you have any nausea.  Drink plenty of fluids.  Activity:  May resume normal activity as soon as abdominal pain disappears.  Listen to your body; if it hurts, don t do it.  Most women can return to work after 24 hours.  Avoid douches, tampons, and intercourse for 1-2 weeks.  Bathing/Incision Care:  May shower as desired but avoid tub baths (submerging incision) until incisions are healed.  Band-Aids may be removed at any time.  If present, Steri-Strips (small, white tape) will fall off on their own in 7-10 days.  There is usually a small suture (stitch) in the incision under the skin that will dissolve on its own and will not need to be removed.  Your doctor will tell you if you have any sutures that require removal.  What to Expect:  Pain: You may have cramps, shoulder pain, or a low backache for 24-48 hours.  Your doctor will prescribe or advise which pain medications to take.  Short, frequent walks may help alleviate gas pain/discomfort.  Bleeding or vaginal discharge: You may have some bleeding or discharge for a week or longer.  Fever: You may have a low fever for the first two days.   Nausea: If you have nausea (feel sick to your stomach), stay in bed.  Try drinking small amounts of 7-Up, tea, or soup.  Dizziness/Weakness: You may feel dizzy or weak for a few days.  If so, you should rest often, stand up slowly, and use caution when climbing stairs.  Notify your surgeon for the following signs and symptoms:  Temperature greater than 100.4 oF  Vaginal bleeding in excess of one pad  (sanitary napkin) per hour  Uncontrolled pain  Incisions becoming more swollen, warm, reddened, or painful  Abdomen feels firm or swollen

## 2022-11-03 NOTE — PROGRESS NOTES
VSS. A/O. Ind. abdo incisions CDI. str8t cath once, voiding fine, PVR is <150 ml both times. Sayda pain. Tolerating diet. d'c home, med/papers given. Oxy script given

## 2022-11-03 NOTE — OP NOTE
Procedure Date: 11/02/2022    PREOPERATIVE DIAGNOSIS:  Symptomatic pelvic organ prolapse with urinary incontinence, indeterminate left adnexal mass.    POSTOPERATIVE DIAGNOSES:  Symptomatic pelvic organ prolapse with urinary incontinence, indeterminate left adnexal mass, with intraabdominal adhesions.    OPERATIVE PROCEDURE:  Examination under anesthesia, da Jackie supracervical hysterectomy, bilateral salpingo-oophorectomy and rectocele repair by Dr. Dmitriy Fairas, Da Jackie sacral colpopexy, midurethral sling procedure and cystoscopy by Dr. Emma Sun.    ASSISTANT:  Vincenzo Bond PA-C she is a surgical first assistant.    ANESTHESIA:  General.    ESTIMATED BLOOD LOSS:  20 mL    PATIENT IDENTIFICATION:  Nirmala Godoy is a 61-year-old patient with known pelvic organ prolapse who was admitted to [facility] for a minimally invasive prolapse surgery.  The patient was initially seen by Dr. Sun for evaluation of vaginal bulge and pelvic pressure.  Dr. Sun identified significant pelvic organ prolapse and she determined that the patient would be a satisfactory candidate for a sacral colpopexy and a midurethral sling.  Dr. Sun performed an ultrasound for further evaluation, which showed a uterus measuring 8.1 x 4.6 x 2.5 cm.  There was a calcified fibroid measuring 1.9 x 1.2 1.6 cm.  The right and left ovaries appeared normal.  There was an indeterminate mass with increased blood flow in the left adnexa measuring 1.4 cm.  There was a trace amount of free pelvic fluid.  The patient was referred to myself for evaluation of her pelvic organ prolapse and of the adnexal mass.  The patient was initially seen in 10/13/2022.  On examination, her pelvic organ prolapse was confirmed with a grade 3 cystocele, grade 3 uterine and apical prolapse and a grade 1 rectocele.  A CA-125 was performed and was 12.  This was reassuring.  The patient was informed that the indeterminate mass of the left  adnexa would be evaluated at the time of minimally invasive surgery.  We discussed at some length the option of a da Jackie supracervical hysterectomy, bilateral salpingo-oophorectomy and possible rectocele repair.  This would be in combination with Dr. Sun's da Jackie sacral colpopexy, midurethral sling procedure and cystoscopy.  The patient was counseled regarding the procedure, the potential complications.  The pros, cons, risks, benefits, as well as the anticipated recovery.  She was aware that mesh material would be used during this procedure and the FDA warnings regarding this product.  The patient understood that hysterectomy with preservation of the cervix would be a portion of the surgery as well as removal of the tubes and ovaries.  After Dr. Sun had completed her portion of the surgery, the patient was informed that I would evaluate the need for a rectocele repair.  All of her questions were answered.  The morning of surgery appropriate consent forms were signed.  Site markings were completed, preoperative assessment was performed and the surgery proceeded.    OPERATIVE FINDINGS:  At the time of examination under anesthesia, the external genitalia and vagina appeared normal.  There was a grade 3 cystocele, grade 3 uterine and apical prolapse and a grade 1 to 1+ rectocele.  There were no adnexal masses palpable on vaginal examination.  At the time of da Jackie laparoscopy, there was noted to be some filmy adhesions of the sigmoid colon to the left lateral abdominal wall.  These were lysed to allow complete mobilization of the sigmoid colon out of the pelvis and to perform the hysterectomy.  The upper abdomen was otherwise normal.  The fallopian tubes had previously been ligated.  The uterus was an atrophic, but normal in size, shape, and contour.  There was a 2 cm uterine fibroid on the posterior left uterine wall.  The ovaries were atrophic.  The fallopian tubes had previously been ligated and  there was a varicosity of the right adnexa.  At the conclusion of the procedure, the anatomic result was excellent.  The cervical stump and vaginal apex were suspended high in the pelvis.  The midurethral sling had been placed and was in excellent position.  Cystoscopy performed by Dr. Sun was normal with no evidence of bladder injury, no evidence of mesh within the bladder.  There were strong jets of urine entering the bladder from each ureteral orifice.  There remained a small rectocele and therefore rectocele repair was performed at the conclusion of the procedure.  The blood loss was minimal at 20 mL.  There were no apparent complications.  The surgical specimens included the uterus with tubes and ovaries attached, and the vaginal mucosa.      OPERATIVE PROCEDURE:  Mrs. Nirmala Godoy was brought to the operating room and the robot unit at [facility].  She was placed in the supine position.  Both intravenous and inhalation anesthesia administered, and endotracheal intubation was performed.  The patient was then placed in the dorsal lithotomy position.  An orogastric tube had been placed.  Examination under anesthesia was performed with the findings documented above.  The vagina, perineum, and abdomen were then prepped and draped in the usual fashion for combined vaginal abdominal surgery.  A timeout was then held, during which the patient's name, CSN number, and surgery to be performed were reviewed.  All members of the operating room staff were in agreement with the patient's identity and the scheduled procedure.  Once the timeout was concluded, the surgery began.    I began the surgery vaginally by placing a  catheter under sterile conditions.  The cervix was visualized and grasped using a tenaculum.  The endocervical canal was dilated to a size #6 Hegar dilator.  This allowed placement of the Hulka tenaculum with the cannula placed up into the lower uterine segment.  The cannula was  covered with the tip of red Mosley catheter.  The Hulka tenaculum was for uterine manipulation and for identification of the endocervical canal at the time of supracervical hysterectomy.  Meanwhile, Dr. Sun and her assistant Vincenzo Bond were placing the abdominal ports.  Pneumoperitoneum had been created and the ports were placed in the typical fashion.  There were 2 da Jackie ports on the patient's left lateral abdomen, and Jackie port in the supraumbilical region, a da Jackie port in the far right lateral abdomen, and a 5 mm AirSeal port in the right upper quadrant.  These ports were all placed under direct visualization and without evidence of injury.  Once the ports were all in place, the da Jackie Xi robot was brought into the surgical field and appropriately docked.  The surgical table had been brought to its lowest-most position and a 25-degree Trendelenburg.  The da Jackie Xi was docked and the da Jackie instruments were then placed under direct visualization.  Through arm #1, the Kardia device, arm #2, Maryland bipolar forceps, arm #3 was the Da Jackie scope, arm #4 was the da Jackie monopolar scissors.  The instruments were all placed safely.  I had taken my position at the da Jackie console and took control of the instruments.  Complete and thorough examination of the abdomen and pelvis was then performed with the findings documented above.  The filmy adhesions of the sigmoid colon were taken down to allow complete mobilization of the sigmoid and to allow completion of the hysterectomy.  The course of the ureters was determined.  The procedure then concluded by cauterizing and cutting the infundibulopelvic ligaments.  The dissection was carried along the mesosalpinx on both sides to the round ligament.  The round ligaments were cauterized and cut.  The anterior leaf of the broad ligament was then incised in a curvilinear fashion from both sides meeting in the midline at the level of the cervix.  The  bladder was dissected away from the cervix and upper vagina.  Continued cauterization along the uterus was performed and the uterine vascular pedicles were sealed and cut.  The dissection was carried down to below the level of the internal os and below the level of the left posterior lateral fibroid.  Hemostasis was present.  The specimen was then amputated by cutting across the cervix just below the level of the internal os with the monopolar scissors.  The Hulka tenaculum was encountered with the red Mosley catheter covering the tip.  The specimen was amputated and placed into a 5 mm EndoCatch bag.  The specimen was left in the abdominal cavity to be removed after the sacral colpopexy.  Complete hemostasis was present.  The endocervical canal was cauterized with the bipolar Maryland forceps.  The procedure was then turned over to Dr. Sun, who took her position at the [employer] console.  I rescrubbed and entered the room at the patient's perineum to assist with  traction during the sacral colpopexy and to determine appropriate tension for the repair.  Dr. Sun then performed a sacral colpopexy repair without incident.  The cervical stump had been sewn closed with 0 PDS sutures.  After the sacral colpopexy was completed, I rescrubbed and entered the room at the patient's bedside.  The da Jackie Xi robot was undocked.  The ports were removed from the abdomen and the gas was allowed to escape.  Direct visualization confirmed presence of no intra-abdominal bleeding.  The supraumbilical incision was widened a short distance to allow removal of the specimen within the EndoCatch bag.  This, however, required closure of the fascia, which was done with 0 Vicryl in a running unlocked stitch.  The remaining abdominal incisions, 2 on the left lateral abdomen, 2 on the right and 1 supraumbilically, were closed using a subcuticular stitch of 4-0 Monocryl and then finally with Exofin skin glue.  Meanwhile,   Dino had been performing the midurethral sling procedure.  Once the sling was in proper place, she performed cystoscopy.  The cystoscopy showed normal bladder with a ureterocele on the patient's left side.  There was efflux of urine into the bladder from both ureteral orifices.  After Dr. Sun had completed the midurethral sling and a cystoscopy, I examined the patient and determined that there remained a grade 1 rectocele.  Rectocele repair was then performed by myself in the usual fashion.  Allis clamps were placed at the introitus.  The posterior vaginal mucosa was injected with a dilute vasopressin solution.  An incision was made at the introitus, and the posterior vaginal mucosa was undermined using Metzenbaum scissors.  Hernández clamps were placed on the cut edges of the vaginal mucosa.  The underlying rectocele was then dissected away from the vaginal mucosa.  Plication sutures of 2-0 Vicryl were then placed, reapproximating the perirectal fascia in the midline.  Excess vaginal mucosa was trimmed.  The vaginal mucosa was closed using interrupted figure-of-X sutures of 0 Vicryl.  The procedure was then brought to a close.    The  catheter was removed and a vaginal packing was placed.  The patient was recalled from general anesthesia, extubated, and brought to recovery room in excellent condition having tolerated the procedure well.  A debriefing was held, during which time the patient's identity, the surgery that was performed, namely da Jackie supracervical hysterectomy, bilateral salpingo-oophorectomy, rectocele repair and lysis of adhesions along with the da Jackie sacral colpopexy, midurethral sling procedure and cystoscopy.  The blood loss of 20 mL was confirmed.  The surgical specimens included the uterus with both tubes and ovaries, vaginal mucosa.  The procedure was then brought to a close.    The patient had been brought to recovery room in excellent condition.  She will be observed for  overnight observation with anticipated discharge in the morning.    Dmitriy Farias MD        D: 2022   T: 2022   MT: KATIANA    Name:     RICK HANSON  MRN:      [id number]        Account:        [id number]   :      1960           Procedure Date: 2022     Document: [id number]    cc:  Dmitriy Farias MD

## 2022-11-03 NOTE — ANESTHESIA POSTPROCEDURE EVALUATION
Patient: Nirmala Godoy    Procedure: Procedure(s):  ROBOTIC ASSISTED LAPAROSCOPIC SUPRACERVICAL HYSTERECTOMY  BILATERAL SALPINGO-OOPHORECTOMY  ROBOTIC ASSISTED SACROCOLPOPEXY, CYSTOSCOPY,  MIDURETHRAL BLADDER  SLING  RECTOCELE REPAIR       Anesthesia Type:  General    Note:  Disposition: Inpatient   Postop Pain Control: Uneventful            Sign Out: Well controlled pain   PONV: No   Neuro/Psych: Uneventful            Sign Out: Acceptable/Baseline neuro status   Airway/Respiratory: Uneventful            Sign Out: Acceptable/Baseline resp. status   CV/Hemodynamics: Uneventful            Sign Out: Acceptable CV status; No obvious hypovolemia; No obvious fluid overload   Other NRE: NONE   DID A NON-ROUTINE EVENT OCCUR? No           Last vitals:  Vitals Value Taken Time   BP 91/56 11/02/22 1840   Temp 36.8  C (98.2  F) 11/02/22 1614   Pulse 64 11/02/22 1844   Resp 12 11/02/22 1844   SpO2 98 % 11/02/22 1844   Vitals shown include unvalidated device data.    Electronically Signed By: Rebecca Chong MD  November 2, 2022  7:41 PM

## 2022-11-03 NOTE — PLAN OF CARE
POD 1 robotic assisted lap supracervical hysterectomy. A&Ox4, VSS on 2L O2. R PIV infusing NS at 100 ml/hr w/ int abx. L PIV SL. On full liquid diet. Up SBA to BR, due to void. Unable to void in the BR, bladder scanned 599 and straight cath 500 of clear orange output. 5 abd lap site w/ liquid bandage and groin incision CDI. Pt wearing mesh panties w/ pad, has minimal bloody drainage. Pain managed with scheduled toradol. Denies N/V. Discharge pending.

## 2022-11-03 NOTE — PROGRESS NOTES
[facility]    Urology Progress Note     Assessment & Plan   Nirmala Godoy is a 61 year old female who was admitted on 11/2/2022. POD 1 s/p robotic hysterectomy/rectocele repair (Dr. Farias)/sacrocolpopexy with MUS and cysto (Dr. Dorsey)    Plan:   -ADAT, ambulate   -cont to monitor voids and PVRs; if unable to adequately void by this afternoon (PVR <300cc) then will need to discharge with sanches and leg bag and f/u early next week for TOV in office   -reviewed expectations for discharge including activity restrictions, pain control, diet, meds     Plan to f/u with Dr. Sun in 1mo for routine postop check     Estrella Barriga PA-C  [facility]   [email or web address]  Text Page (7:30am to 4:30pm)      Interval History   Feeling well this AM, incisional discomfort   Ambulating in the room, going well   Some trouble voiding, has required straight cath x3 but feels this is improving   deanna diet, no n/v, no flatus yet    AVSS    Physical Exam   Temp: 99.6  F (37.6  C) Temp src: Oral BP: 121/66 Pulse: 54   Resp: 16 SpO2: 94 % O2 Device: None (Room air) Oxygen Delivery: 2 LPM  Vitals:    11/02/22 1026   Weight: 72.7 kg (160 lb 3.2 oz)     Vital Signs with Ranges  Temp:  [98.2  F (36.8  C)-99.6  F (37.6  C)] 99.6  F (37.6  C)  Pulse:  [53-82] 54  Resp:  [8-22] 16  BP: ()/(45-85) 121/66  SpO2:  [94 %-98 %] 94 %  I/O last 3 completed shifts:  In: 1923.33 [I.V.:1903.33; IV Piggyback:20]  Out: 920 [Urine:900; Blood:20]    Alert and oriented  Breathing unlabored  Abdomen soft, approp tender post op, no rebound or guarding  Incisions clean, dry, intact and lali  Extremities warm and well perfused, no edema      Medications     sodium chloride 100 mL/hr at 11/03/22 0534       acetaminophen  1,000 mg Oral QAM     amLODIPine  5 mg Oral QAM     cefOXitin  1 g Intravenous Q8H     ketorolac  15 mg Intravenous Q6H     sodium chloride (PF)  3 mL Intracatheter Q8H       Data    Results for orders placed or performed during the hospital encounter of 11/02/22 (from the past 24 hour(s))   Glucose by meter   Result Value Ref Range    GLUCOSE BY METER POCT 118 (H) 70 - 99 mg/dL

## 2022-11-03 NOTE — PLAN OF CARE
[id number]    POD 0 robotic assisted lap supracervical hysterectomy. VSS on 2L. A&Ox4. Up SBA to BR. 5 abd port site covered with liquid bandage. 2 small groin inc with liquid bandage. Both WDL. Vaginal packing removed in pacu; mesh panties on with small amount bloody drainage. R PIV infusing NS w/ int abx. L PIV SL. Pain managed with scheduled toradol. Unable to void, bladder scanned for ~700. Straight cathed for 400 of orange output. Discharge pending.

## 2022-11-04 LAB
PATH REPORT.COMMENTS IMP SPEC: NORMAL
PATH REPORT.COMMENTS IMP SPEC: NORMAL
PATH REPORT.FINAL DX SPEC: NORMAL
PATH REPORT.GROSS SPEC: NORMAL
PATH REPORT.MICROSCOPIC SPEC OTHER STN: NORMAL
PATH REPORT.RELEVANT HX SPEC: NORMAL
PHOTO IMAGE: NORMAL

## 2023-02-12 ENCOUNTER — HEALTH MAINTENANCE LETTER (OUTPATIENT)
Age: 63
End: 2023-02-12

## 2023-03-29 ENCOUNTER — LAB REQUISITION (OUTPATIENT)
Dept: LAB | Facility: CLINIC | Age: 63
End: 2023-03-29

## 2023-03-29 PROCEDURE — U0005 INFEC AGEN DETEC AMPLI PROBE: HCPCS | Performed by: INTERNAL MEDICINE

## 2023-03-30 LAB — SARS-COV-2 RNA RESP QL NAA+PROBE: POSITIVE

## 2023-12-31 ENCOUNTER — HEALTH MAINTENANCE LETTER (OUTPATIENT)
Age: 63
End: 2023-12-31

## 2024-03-10 ENCOUNTER — HEALTH MAINTENANCE LETTER (OUTPATIENT)
Age: 64
End: 2024-03-10

## 2024-07-19 ENCOUNTER — PATIENT OUTREACH (OUTPATIENT)
Dept: CARE COORDINATION | Facility: CLINIC | Age: 64
End: 2024-07-19

## 2024-07-19 NOTE — PROGRESS NOTES
Clinical Product Navigator RN reviewed chart; patient on payer product coverage.  Review results:   CPN Initial Information Gathering  Referral Source: Health Plan    Patient identified by [facility] for RN Clinical Product Navigator review. Patient is followed by [facility] and is not a candidate for [facility] primary care - care coordination.    Melissa Behl BSN, RN, PHN, CCM  RN Clinical Product Navigator  [phone]

## 2025-02-16 ENCOUNTER — HEALTH MAINTENANCE LETTER (OUTPATIENT)
Age: 65
End: 2025-02-16

## 2025-03-16 ENCOUNTER — HEALTH MAINTENANCE LETTER (OUTPATIENT)
Age: 65
End: 2025-03-16

## (undated) DEVICE — SUCTION IRR STRYKERFLOW II W/TIP 250-070-520

## (undated) DEVICE — TUBING IRRIG TUR Y TYPE 96" LF 6543-01

## (undated) DEVICE — SUCTION CANISTER MEDIVAC LINER 3000ML W/LID 65651-530

## (undated) DEVICE — DAVINCI XI ESU FCP BIPOLAR MARYLAND 470172

## (undated) DEVICE — NDL INSUFFLATION 13GA 120MM C2201

## (undated) DEVICE — SU VICRYL 3-0 CT-1 36" J338H

## (undated) DEVICE — BLADE KNIFE SURG 15 371115

## (undated) DEVICE — DAVINCI XI FCP CADIERE 470049

## (undated) DEVICE — BLADE CLIPPER 4406

## (undated) DEVICE — DECANTER BAG 2002S

## (undated) DEVICE — SOL WATER IRRIG 3000ML BAG 2B7117

## (undated) DEVICE — SU VICRYL 0 UR-6 27" J603H

## (undated) DEVICE — DRAPE IOBAN INCISE 23X17" 6650EZ

## (undated) DEVICE — DECANTER VIAL 2006S

## (undated) DEVICE — SOL WATER IRRIG 1000ML BOTTLE 2F7114

## (undated) DEVICE — DRAPE MAYO STAND 23X54 8337

## (undated) DEVICE — SOL NACL 0.9% INJ 1000ML BAG 2B1324X

## (undated) DEVICE — SOL NACL 0.9% IRRIG 1000ML BOTTLE 2F7124

## (undated) DEVICE — SYSTEM LAPAROVUE VISIBILITY LAPVUE10

## (undated) DEVICE — DAVINCI XI SEAL UNIVERSAL 5-8MM 470361

## (undated) DEVICE — DAVINCI HOT SHEARS TIP COVER  400180

## (undated) DEVICE — TUBING CONMED AIRSEAL SMOKE EVAC INSUFFLATION ASM-EVAC

## (undated) DEVICE — SU PROLENE 2-0 V-7 36" 8977H

## (undated) DEVICE — ENDO POUCH UNIVERSAL RETRIEVAL SYSTEM INZII 5MM CD003

## (undated) DEVICE — SYR 10ML FINGER CONTROL W/O NDL 309695

## (undated) DEVICE — CATH TRAY FOLEY SURESTEP 16FR WDRAIN BAG STLK LATEX A300316A

## (undated) DEVICE — SU VICRYL 2-0 SH 27" J317H

## (undated) DEVICE — ESU PENCIL W/HOLSTER E2350H

## (undated) DEVICE — NDL 22GA 1.5"

## (undated) DEVICE — DAVINCI XI GRASPER ENDOWRIST BIPOLAR LONG 470400

## (undated) DEVICE — DAVINCI XI NDL DRIVER MEGA SUTURE CUT 8MM 470309

## (undated) DEVICE — TUBING IRRIG CYSTO/BLADDER SET 81" LF 2C4040

## (undated) DEVICE — SU PDS II 3-0 SH 27" Z316H

## (undated) DEVICE — DAVINCI XI DRAPE COLUMN 470341

## (undated) DEVICE — SU VICRYL 2-0 CT-2 27" J333H

## (undated) DEVICE — TUBING SUCTION 12"X1/4" N612

## (undated) DEVICE — DAVINCI XI DRAPE ARM 470015

## (undated) DEVICE — SU ETHIBOND 2-0 SHDA 30" X563H

## (undated) DEVICE — DAVINCI XI OBTURATOR BLADELESS 8MM 470359

## (undated) DEVICE — NDL COUNTER 10CT

## (undated) DEVICE — PACK DAVINCI GYN SMA15GDFS1

## (undated) DEVICE — LINEN TOWEL PACK X5 5464

## (undated) DEVICE — DRAPE POUCH IRR 1016

## (undated) DEVICE — SU MONOCRYL 4-0 PS-2 18" UND Y496G

## (undated) DEVICE — ENDO TROCAR CONMED AIRSEAL BLADELESS 05X120MM IAS5-120LP

## (undated) DEVICE — Device

## (undated) DEVICE — KIT PATIENT POSITIONING PIGAZZI LATEX FREE 40580

## (undated) DEVICE — ESU GROUND PAD UNIVERSAL W/O CORD

## (undated) DEVICE — PACK TVT HYSTEROSCOPY SMA15HYFSE

## (undated) RX ORDER — BUPIVACAINE HYDROCHLORIDE 2.5 MG/ML
INJECTION, SOLUTION EPIDURAL; INFILTRATION; INTRACAUDAL
Status: DISPENSED
Start: 2022-11-02

## (undated) RX ORDER — EPINEPHRINE 1 MG/ML
INJECTION, SOLUTION INTRAMUSCULAR; SUBCUTANEOUS
Status: DISPENSED
Start: 2022-11-02

## (undated) RX ORDER — KETOROLAC TROMETHAMINE 15 MG/ML
INJECTION, SOLUTION INTRAMUSCULAR; INTRAVENOUS
Status: DISPENSED
Start: 2022-11-02

## (undated) RX ORDER — PROPOFOL 10 MG/ML
INJECTION, EMULSION INTRAVENOUS
Status: DISPENSED
Start: 2022-11-02

## (undated) RX ORDER — HYDROMORPHONE HYDROCHLORIDE 1 MG/ML
INJECTION, SOLUTION INTRAMUSCULAR; INTRAVENOUS; SUBCUTANEOUS
Status: DISPENSED
Start: 2022-11-02

## (undated) RX ORDER — CEFOXITIN 2 G/1
INJECTION, POWDER, FOR SOLUTION INTRAVENOUS
Status: DISPENSED
Start: 2022-11-02

## (undated) RX ORDER — FENTANYL CITRATE 50 UG/ML
INJECTION, SOLUTION INTRAMUSCULAR; INTRAVENOUS
Status: DISPENSED
Start: 2022-11-02

## (undated) RX ORDER — ONDANSETRON 2 MG/ML
INJECTION INTRAMUSCULAR; INTRAVENOUS
Status: DISPENSED
Start: 2022-11-02

## (undated) RX ORDER — PHENAZOPYRIDINE HYDROCHLORIDE 200 MG/1
TABLET, FILM COATED ORAL
Status: DISPENSED
Start: 2022-11-02

## (undated) RX ORDER — VASOPRESSIN 20 U/ML
INJECTION PARENTERAL
Status: DISPENSED
Start: 2022-11-02

## (undated) RX ORDER — BUPIVACAINE HYDROCHLORIDE 5 MG/ML
INJECTION, SOLUTION EPIDURAL; INTRACAUDAL
Status: DISPENSED
Start: 2022-11-02

## (undated) RX ORDER — FENTANYL CITRATE 0.05 MG/ML
INJECTION, SOLUTION INTRAMUSCULAR; INTRAVENOUS
Status: DISPENSED
Start: 2022-11-02